# Patient Record
Sex: FEMALE | Race: WHITE | NOT HISPANIC OR LATINO | Employment: FULL TIME | URBAN - METROPOLITAN AREA
[De-identification: names, ages, dates, MRNs, and addresses within clinical notes are randomized per-mention and may not be internally consistent; named-entity substitution may affect disease eponyms.]

---

## 2020-06-03 ENCOUNTER — TELEPHONE (OUTPATIENT)
Dept: OBSTETRICS AND GYNECOLOGY | Facility: CLINIC | Age: 50
End: 2020-06-03

## 2020-06-03 NOTE — TELEPHONE ENCOUNTER
----- Message from Sb Ramirez sent at 6/3/2020  2:47 PM CDT -----  Contact: Pt  Please call Shadia to discuss an appointment question she has [phone] (home).

## 2020-06-10 ENCOUNTER — TELEPHONE (OUTPATIENT)
Dept: OBSTETRICS AND GYNECOLOGY | Facility: CLINIC | Age: 50
End: 2020-06-10

## 2020-06-10 NOTE — TELEPHONE ENCOUNTER
Spoke with pt, she was questioning if we had coded her breast mri differently then last year, the breast center told her it was going to her deductible and last year they covered it at 100%. I told her we coded it the same and that she should call her insurance company to see why it is going deductible. PT verbalized understanding. AF

## 2020-06-10 NOTE — TELEPHONE ENCOUNTER
----- Message from Joanie Mederos sent at 6/10/2020 10:25 AM CDT -----  Contact: pt  Please call back need to talk about test [phone]

## 2020-06-12 ENCOUNTER — TELEPHONE (OUTPATIENT)
Dept: OBSTETRICS AND GYNECOLOGY | Facility: CLINIC | Age: 50
End: 2020-06-12

## 2020-06-12 NOTE — TELEPHONE ENCOUNTER
----- Message from Kimberley Santyoo NP sent at 6/12/2020 12:33 PM CDT -----  Please call and inform patient her recent breast imaging results were within normal limits.

## 2020-06-16 ENCOUNTER — TELEPHONE (OUTPATIENT)
Dept: OBSTETRICS AND GYNECOLOGY | Facility: CLINIC | Age: 50
End: 2020-06-16

## 2020-06-16 NOTE — TELEPHONE ENCOUNTER
Pt called, pt verified , pt notified of normal breast imaging, pt acknowledged understanding.No complaints noted.

## 2020-07-27 ENCOUNTER — OFFICE VISIT (OUTPATIENT)
Dept: OBSTETRICS AND GYNECOLOGY | Facility: CLINIC | Age: 50
End: 2020-07-27
Payer: COMMERCIAL

## 2020-07-27 VITALS
DIASTOLIC BLOOD PRESSURE: 62 MMHG | WEIGHT: 149 LBS | HEIGHT: 70 IN | SYSTOLIC BLOOD PRESSURE: 110 MMHG | BODY MASS INDEX: 21.33 KG/M2

## 2020-07-27 DIAGNOSIS — Z91.89 AT HIGH RISK FOR BREAST CANCER: Primary | ICD-10-CM

## 2020-07-27 PROCEDURE — 99213 PR OFFICE/OUTPT VISIT, EST, LEVL III, 20-29 MIN: ICD-10-PCS | Mod: S$GLB,,, | Performed by: OBSTETRICS & GYNECOLOGY

## 2020-07-27 PROCEDURE — 99213 OFFICE O/P EST LOW 20 MIN: CPT | Mod: S$GLB,,, | Performed by: OBSTETRICS & GYNECOLOGY

## 2020-07-27 PROCEDURE — 3008F PR BODY MASS INDEX (BMI) DOCUMENTED: ICD-10-PCS | Mod: CPTII,S$GLB,, | Performed by: OBSTETRICS & GYNECOLOGY

## 2020-07-27 PROCEDURE — 3008F BODY MASS INDEX DOCD: CPT | Mod: CPTII,S$GLB,, | Performed by: OBSTETRICS & GYNECOLOGY

## 2020-07-27 RX ORDER — EFINACONAZOLE 100 MG/ML
SOLUTION TOPICAL
COMMUNITY
Start: 2020-05-28

## 2020-07-27 RX ORDER — MONTELUKAST SODIUM 10 MG/1
TABLET ORAL
COMMUNITY
Start: 2017-07-27

## 2020-07-27 RX ORDER — ESTRADIOL 1 MG/1
TABLET ORAL
COMMUNITY
Start: 2020-07-16 | End: 2021-09-13

## 2020-07-27 RX ORDER — LEVOTHYROXINE SODIUM 150 MCG
TABLET ORAL
COMMUNITY
Start: 2020-07-22

## 2020-07-27 NOTE — PROGRESS NOTES
Subjective:       Patient ID: Shadia Washington is a 50 y.o. female.    Chief Complaint:  Breast Problem (breast check )      History of Present Illness  HPI  Presents for visit. S/p MRI and Mammogram  Doing well    GYN & OB History  No LMP recorded. Patient has had a hysterectomy.   Date of Last Pap: No result found    OB History    Para Term  AB Living   2         2   SAB TAB Ectopic Multiple Live Births                  # Outcome Date GA Lbr Ubaldo/2nd Weight Sex Delivery Anes PTL Lv   2             1                 Review of Systems  Review of Systems   Constitutional: Negative for activity change, appetite change, chills, diaphoresis, fatigue, fever and unexpected weight change.   Respiratory: Negative for cough and shortness of breath.    Cardiovascular: Negative for chest pain, palpitations and leg swelling.   Gastrointestinal: Negative for abdominal pain, bloating, constipation and diarrhea.   Genitourinary: Negative for decreased libido, dysmenorrhea, vaginal bleeding, vaginal discharge, vaginal pain, vaginal dryness and vaginal odor.   Musculoskeletal: Negative for back pain and joint swelling.   Integumentary:  Negative for rash and acne.   Psychiatric/Behavioral: Negative for depression. The patient is not nervous/anxious.            Objective:    Physical Exam:   Constitutional: She is oriented to person, place, and time. She appears well-developed and well-nourished. She is cooperative.      Neck: No thyroid mass and no thyromegaly present.    Cardiovascular: Normal rate and normal pulses.     Pulmonary/Chest: Effort normal. No respiratory distress. Chest wall is not dull to percussion. She exhibits no mass, no tenderness, no bony tenderness, no laceration, no crepitus, no edema, no deformity, no swelling and no retraction. Right breast exhibits no inverted nipple, no mass, no nipple discharge, no skin change, no tenderness, presence, no bleeding and no swelling. Left breast  exhibits no inverted nipple, no mass, no nipple discharge, no skin change, no tenderness, presence, no bleeding and no swelling. Breasts are symmetrical.   Fibrocystic changes bilaterally. Symmetrically.        Abdominal: Soft. Normal appearance. She exhibits no distension. There is no abdominal tenderness. No hernia.             Musculoskeletal: Moves all extremeties.       Neurological: She is alert and oriented to person, place, and time.    Skin: Skin is warm and dry. No rash noted.    Psychiatric: She has a normal mood and affect. Her speech is normal and behavior is normal. Judgment and thought content normal.          Assessment:     Breast Exam (High risk for breast cancer)        Plan:      Discussed votiva

## 2021-03-01 ENCOUNTER — OFFICE VISIT (OUTPATIENT)
Dept: OBSTETRICS AND GYNECOLOGY | Facility: CLINIC | Age: 51
End: 2021-03-01
Payer: COMMERCIAL

## 2021-03-01 VITALS
HEIGHT: 70 IN | DIASTOLIC BLOOD PRESSURE: 82 MMHG | BODY MASS INDEX: 21.47 KG/M2 | WEIGHT: 150 LBS | HEART RATE: 74 BPM | SYSTOLIC BLOOD PRESSURE: 121 MMHG

## 2021-03-01 DIAGNOSIS — Z01.419 ROUTINE GYNECOLOGICAL EXAMINATION: Primary | ICD-10-CM

## 2021-03-01 PROCEDURE — 3008F PR BODY MASS INDEX (BMI) DOCUMENTED: ICD-10-PCS | Mod: CPTII,S$GLB,, | Performed by: OBSTETRICS & GYNECOLOGY

## 2021-03-01 PROCEDURE — 99396 PREV VISIT EST AGE 40-64: CPT | Mod: S$GLB,,, | Performed by: OBSTETRICS & GYNECOLOGY

## 2021-03-01 PROCEDURE — 3008F BODY MASS INDEX DOCD: CPT | Mod: CPTII,S$GLB,, | Performed by: OBSTETRICS & GYNECOLOGY

## 2021-03-01 PROCEDURE — 1126F AMNT PAIN NOTED NONE PRSNT: CPT | Mod: S$GLB,,, | Performed by: OBSTETRICS & GYNECOLOGY

## 2021-03-01 PROCEDURE — 99396 PR PREVENTIVE VISIT,EST,40-64: ICD-10-PCS | Mod: S$GLB,,, | Performed by: OBSTETRICS & GYNECOLOGY

## 2021-03-01 PROCEDURE — 1126F PR PAIN SEVERITY QUANTIFIED, NO PAIN PRESENT: ICD-10-PCS | Mod: S$GLB,,, | Performed by: OBSTETRICS & GYNECOLOGY

## 2021-03-01 RX ORDER — ESTRADIOL 0.1 MG/G
1 CREAM VAGINAL DAILY
Qty: 42.5 G | Refills: 1 | Status: SHIPPED | OUTPATIENT
Start: 2021-03-01 | End: 2021-09-13

## 2021-03-01 RX ORDER — ESTRADIOL 0.1 MG/D
1 FILM, EXTENDED RELEASE TRANSDERMAL
Qty: 8 PATCH | Refills: 12 | Status: SHIPPED | OUTPATIENT
Start: 2021-03-01 | End: 2022-03-15 | Stop reason: SDUPTHER

## 2021-03-15 ENCOUNTER — TELEPHONE (OUTPATIENT)
Dept: OBSTETRICS AND GYNECOLOGY | Facility: CLINIC | Age: 51
End: 2021-03-15

## 2021-03-16 ENCOUNTER — TELEPHONE (OUTPATIENT)
Dept: OBSTETRICS AND GYNECOLOGY | Facility: CLINIC | Age: 51
End: 2021-03-16

## 2021-03-22 ENCOUNTER — TELEPHONE (OUTPATIENT)
Dept: OBSTETRICS AND GYNECOLOGY | Facility: CLINIC | Age: 51
End: 2021-03-22

## 2021-09-07 ENCOUNTER — OFFICE VISIT (OUTPATIENT)
Dept: OBSTETRICS AND GYNECOLOGY | Facility: CLINIC | Age: 51
End: 2021-09-07
Payer: COMMERCIAL

## 2021-09-07 VITALS
DIASTOLIC BLOOD PRESSURE: 79 MMHG | HEART RATE: 90 BPM | HEIGHT: 70 IN | SYSTOLIC BLOOD PRESSURE: 120 MMHG | BODY MASS INDEX: 21.49 KG/M2 | WEIGHT: 150.13 LBS

## 2021-09-07 DIAGNOSIS — Z91.89 AT HIGH RISK FOR BREAST CANCER: Primary | ICD-10-CM

## 2021-09-07 PROCEDURE — 3008F BODY MASS INDEX DOCD: CPT | Mod: CPTII,S$GLB,, | Performed by: NURSE PRACTITIONER

## 2021-09-07 PROCEDURE — 1159F MED LIST DOCD IN RCRD: CPT | Mod: CPTII,S$GLB,, | Performed by: NURSE PRACTITIONER

## 2021-09-07 PROCEDURE — 3074F SYST BP LT 130 MM HG: CPT | Mod: CPTII,S$GLB,, | Performed by: NURSE PRACTITIONER

## 2021-09-07 PROCEDURE — 3078F DIAST BP <80 MM HG: CPT | Mod: CPTII,S$GLB,, | Performed by: NURSE PRACTITIONER

## 2021-09-07 PROCEDURE — 3078F PR MOST RECENT DIASTOLIC BLOOD PRESSURE < 80 MM HG: ICD-10-PCS | Mod: CPTII,S$GLB,, | Performed by: NURSE PRACTITIONER

## 2021-09-07 PROCEDURE — 1159F PR MEDICATION LIST DOCUMENTED IN MEDICAL RECORD: ICD-10-PCS | Mod: CPTII,S$GLB,, | Performed by: NURSE PRACTITIONER

## 2021-09-07 PROCEDURE — 99213 PR OFFICE/OUTPT VISIT, EST, LEVL III, 20-29 MIN: ICD-10-PCS | Mod: S$GLB,,, | Performed by: NURSE PRACTITIONER

## 2021-09-07 PROCEDURE — 3008F PR BODY MASS INDEX (BMI) DOCUMENTED: ICD-10-PCS | Mod: CPTII,S$GLB,, | Performed by: NURSE PRACTITIONER

## 2021-09-07 PROCEDURE — 99213 OFFICE O/P EST LOW 20 MIN: CPT | Mod: S$GLB,,, | Performed by: NURSE PRACTITIONER

## 2021-09-07 PROCEDURE — 3074F PR MOST RECENT SYSTOLIC BLOOD PRESSURE < 130 MM HG: ICD-10-PCS | Mod: CPTII,S$GLB,, | Performed by: NURSE PRACTITIONER

## 2021-09-13 PROBLEM — Z91.89 AT HIGH RISK FOR BREAST CANCER: Status: ACTIVE | Noted: 2021-09-13

## 2022-02-28 ENCOUNTER — TELEPHONE (OUTPATIENT)
Dept: OBSTETRICS AND GYNECOLOGY | Facility: CLINIC | Age: 52
End: 2022-02-28
Payer: COMMERCIAL

## 2022-04-06 ENCOUNTER — OFFICE VISIT (OUTPATIENT)
Dept: OBSTETRICS AND GYNECOLOGY | Facility: CLINIC | Age: 52
End: 2022-04-06
Payer: COMMERCIAL

## 2022-04-06 VITALS
WEIGHT: 152 LBS | HEART RATE: 96 BPM | SYSTOLIC BLOOD PRESSURE: 138 MMHG | HEIGHT: 70 IN | BODY MASS INDEX: 21.76 KG/M2 | DIASTOLIC BLOOD PRESSURE: 90 MMHG

## 2022-04-06 DIAGNOSIS — Z01.419 ROUTINE GYNECOLOGICAL EXAMINATION: ICD-10-CM

## 2022-04-06 DIAGNOSIS — Z12.39 BREAST CANCER SCREENING, HIGH RISK PATIENT: ICD-10-CM

## 2022-04-06 DIAGNOSIS — Z12.31 SCREENING MAMMOGRAM FOR HIGH-RISK PATIENT: Primary | ICD-10-CM

## 2022-04-06 PROCEDURE — 1159F PR MEDICATION LIST DOCUMENTED IN MEDICAL RECORD: ICD-10-PCS | Mod: CPTII,S$GLB,, | Performed by: OBSTETRICS & GYNECOLOGY

## 2022-04-06 PROCEDURE — 3080F DIAST BP >= 90 MM HG: CPT | Mod: CPTII,S$GLB,, | Performed by: OBSTETRICS & GYNECOLOGY

## 2022-04-06 PROCEDURE — 99396 PR PREVENTIVE VISIT,EST,40-64: ICD-10-PCS | Mod: S$GLB,,, | Performed by: OBSTETRICS & GYNECOLOGY

## 2022-04-06 PROCEDURE — 99396 PREV VISIT EST AGE 40-64: CPT | Mod: S$GLB,,, | Performed by: OBSTETRICS & GYNECOLOGY

## 2022-04-06 PROCEDURE — 1159F MED LIST DOCD IN RCRD: CPT | Mod: CPTII,S$GLB,, | Performed by: OBSTETRICS & GYNECOLOGY

## 2022-04-06 PROCEDURE — 3008F PR BODY MASS INDEX (BMI) DOCUMENTED: ICD-10-PCS | Mod: CPTII,S$GLB,, | Performed by: OBSTETRICS & GYNECOLOGY

## 2022-04-06 PROCEDURE — 3075F SYST BP GE 130 - 139MM HG: CPT | Mod: CPTII,S$GLB,, | Performed by: OBSTETRICS & GYNECOLOGY

## 2022-04-06 PROCEDURE — 3080F PR MOST RECENT DIASTOLIC BLOOD PRESSURE >= 90 MM HG: ICD-10-PCS | Mod: CPTII,S$GLB,, | Performed by: OBSTETRICS & GYNECOLOGY

## 2022-04-06 PROCEDURE — 3075F PR MOST RECENT SYSTOLIC BLOOD PRESS GE 130-139MM HG: ICD-10-PCS | Mod: CPTII,S$GLB,, | Performed by: OBSTETRICS & GYNECOLOGY

## 2022-04-06 PROCEDURE — 3008F BODY MASS INDEX DOCD: CPT | Mod: CPTII,S$GLB,, | Performed by: OBSTETRICS & GYNECOLOGY

## 2022-04-06 RX ORDER — ESTRADIOL 0.1 MG/D
1 FILM, EXTENDED RELEASE TRANSDERMAL
Qty: 8 PATCH | Refills: 12 | Status: SHIPPED | OUTPATIENT
Start: 2022-04-07 | End: 2022-06-21

## 2022-04-06 NOTE — PROGRESS NOTES
Patient is present for annual well woman visit. States concerns of vaginal dryness and decrease libido.

## 2022-04-06 NOTE — PROGRESS NOTES
Subjective:       Patient ID: Shadia Washington is a 52 y.o. female.    Chief Complaint:  Well Woman      History of Present Illness  HPI  Patient with decreased libido and vaginal dryness.     GYN & OB History  No LMP recorded. Patient has had a hysterectomy.   Date of Last Pap: No result found    OB History    Para Term  AB Living   2 2 1 1   2   SAB IAB Ectopic Multiple Live Births           2      # Outcome Date GA Lbr Ubaldo/2nd Weight Sex Delivery Anes PTL Lv   2 Term 90 40w0d  3.6 kg (7 lb 15 oz)  Vag-Spont None N STEVEN   1  87 36w0d  2.268 kg (5 lb) F Vag-Spont None Y STEVEN       Review of Systems  Review of Systems   Constitutional: Negative for activity change, appetite change, fatigue, fever and unexpected weight change.   HENT: Negative for nasal congestion and tinnitus.    Eyes: Negative for visual disturbance.   Respiratory: Negative for cough and shortness of breath.    Cardiovascular: Negative for chest pain and leg swelling.   Gastrointestinal: Negative for abdominal pain, bloating, blood in stool, constipation and diarrhea.   Genitourinary: Positive for decreased libido and dyspareunia. Negative for bladder incontinence, dysuria, vaginal bleeding, vaginal discharge, vaginal pain, vaginal dryness and vaginal odor.   Musculoskeletal: Negative for arthralgias, back pain and joint swelling.   Integumentary:  Negative for acne.   Neurological: Negative for headaches.   Psychiatric/Behavioral: Negative for depression and sleep disturbance. The patient is not nervous/anxious.            Objective:    Physical Exam:   Constitutional: She is oriented to person, place, and time. Vital signs are normal. She appears well-developed and well-nourished. She is cooperative.      Neck: No thyroid mass and no thyromegaly present.    Cardiovascular: Normal rate, regular rhythm and normal pulses.     Pulmonary/Chest: Effort normal. No respiratory distress. Chest wall is not dull to percussion.  She exhibits no mass, no bony tenderness, no laceration, no crepitus, no edema, no deformity, no swelling and no retraction. Right breast exhibits no inverted nipple, no mass, no nipple discharge, no skin change, no tenderness, presence, no bleeding and no swelling. Left breast exhibits no inverted nipple, no mass, no nipple discharge, no skin change, no tenderness, presence, no bleeding and no swelling.        Abdominal: Soft. She exhibits no distension. There is no abdominal tenderness. No hernia.     Genitourinary:    Vagina, uterus and rectum normal.      Pelvic exam was performed with patient supine.   Labial bartholins normal.There is no rash, tenderness, lesion or injury on the right labia. There is no rash, tenderness, lesion or injury on the left labia. Cervix is normal. Right adnexum displays no mass, no tenderness and no fullness. Left adnexum displays no mass, no tenderness and no fullness. No  no vaginal discharge, rectocele, cystocele or unspecified prolapse of vaginal walls in the vagina.           Musculoskeletal: Moves all extremeties.       Neurological: She is alert and oriented to person, place, and time.    Skin: Skin is warm and dry. No rash noted.    Psychiatric: She has a normal mood and affect. Her speech is normal and behavior is normal. Judgment and thought content normal.          Assessment:        1. Screening mammogram for high-risk patient    Well Woman Exam        Plan:      Routine care  Discussed votiva   Continue estrogen patch.

## 2022-05-17 ENCOUNTER — PATIENT MESSAGE (OUTPATIENT)
Dept: OBSTETRICS AND GYNECOLOGY | Facility: CLINIC | Age: 52
End: 2022-05-17
Payer: COMMERCIAL

## 2022-06-21 ENCOUNTER — TELEPHONE (OUTPATIENT)
Dept: OBSTETRICS AND GYNECOLOGY | Facility: CLINIC | Age: 52
End: 2022-06-21
Payer: COMMERCIAL

## 2022-06-21 RX ORDER — ESTRADIOL 1 MG/G
1 GEL TOPICAL DAILY
Qty: 30 G | Refills: 12 | Status: SHIPPED | OUTPATIENT
Start: 2022-06-21 | End: 2022-07-19

## 2022-06-21 NOTE — TELEPHONE ENCOUNTER
Patient's call was returned,she says her Estradiol patch is causing her to breakout and would like to have something else called out, provider notified and new medication was sent out.           kavita

## 2022-06-22 ENCOUNTER — TELEPHONE (OUTPATIENT)
Dept: OBSTETRICS AND GYNECOLOGY | Facility: CLINIC | Age: 52
End: 2022-06-22
Payer: COMMERCIAL

## 2022-06-22 NOTE — TELEPHONE ENCOUNTER
Returned patient's call, she is wanting to change her medication from Divigel to a pill form of estradiol. Told patient that the provider is out of the office and will address when she returns. Patient acknowledged understanding.           kavita

## 2022-06-22 NOTE — TELEPHONE ENCOUNTER
----- Message from Nola Quiles sent at 6/22/2022  4:18 PM CDT -----  Contact: Patient  Patient need the PILL called in  instead of the GEL RX      estradioL (DIVIGEL) 1 mg/gram (0.1 %) topical gel              .  [id number] - [place] SERV[address] [facility] AT [facility] OF [facility] [address] [address]  Phone: [phone] Fax: [phone]        Patient call back #   [phone]

## 2022-06-28 RX ORDER — ESTRADIOL 2 MG/1
2 TABLET ORAL DAILY
Qty: 90 TABLET | Refills: 3 | Status: SHIPPED | OUTPATIENT
Start: 2022-06-28 | End: 2022-07-19

## 2022-07-19 ENCOUNTER — PATIENT MESSAGE (OUTPATIENT)
Dept: OBSTETRICS AND GYNECOLOGY | Facility: CLINIC | Age: 52
End: 2022-07-19
Payer: COMMERCIAL

## 2022-07-19 RX ORDER — ESTRADIOL 0.1 MG/D
1 FILM, EXTENDED RELEASE TRANSDERMAL
Qty: 8 PATCH | Refills: 12 | Status: SHIPPED | OUTPATIENT
Start: 2022-07-21 | End: 2023-05-03

## 2022-10-10 ENCOUNTER — TELEPHONE (OUTPATIENT)
Dept: OBSTETRICS AND GYNECOLOGY | Facility: CLINIC | Age: 52
End: 2022-10-10
Payer: COMMERCIAL

## 2022-10-10 NOTE — TELEPHONE ENCOUNTER
Phone message noted. Pt in clinic correct dx code applied to order.          ----- Message from Nola Quiles sent at 10/10/2022  8:27 AM CDT -----  Contact: [facility]  Please call Lakeisha from [facility].  Lakeisha need a corrected order.  You all sent a Mammogram order but patient is having a MRI      Patient is there waiting.   Please send ASAP      Fax #  [phone]      Call back #   [phone]

## 2022-11-03 PROBLEM — S46.011A TRAUMATIC INCOMPLETE TEAR OF RIGHT ROTATOR CUFF: Status: ACTIVE | Noted: 2022-11-03

## 2022-11-03 PROBLEM — S43.431A SUPERIOR GLENOID LABRUM LESION OF RIGHT SHOULDER: Status: ACTIVE | Noted: 2022-11-03

## 2022-11-03 PROBLEM — M19.011 PRIMARY OSTEOARTHRITIS OF RIGHT SHOULDER: Status: ACTIVE | Noted: 2022-11-03

## 2023-05-03 RX ORDER — ESTRADIOL 1 MG/1
TABLET ORAL
COMMUNITY
Start: 2023-01-24 | End: 2023-05-04 | Stop reason: SDUPTHER

## 2023-05-03 NOTE — PROGRESS NOTES
CC:  WELL WOMAN (menop/ hyst with BSO)  Patient Care Team:  Priyanka Wells MD as PCP - General (Internal Medicine)    NEW PATIENT       HPI:  Patient is a 53 y.o. who presents for her well woman exam today.  History reviewed with patient.   Patient is without complaints or concerns today.     Her hyst was for bleeding in    HRT:  estrogen only (systemic)  HX ABNL PAPS:  -pap neg, hpv +    REVIEW OF PRIOR DATA/ HEALTH MAINTENANCE:  LAST ANNUAL:   2022    LAST MMG (screening)- 2022- normal at [facility] and LAST BREAST MRI- 2022 at [facility]    LAST LABS- up to date with PCP    LAST COLONOSCOPY- - by Dr. Sterling - q 10 yrs (neg)   LAST DEXA- - normal at [facility]    Past Medical History:   Diagnosis Date    Dense breast tissue on mammogram     Gastric ulcer     History of HPV infection     Increased risk of breast cancer     LTR 27%, genetic testing neg, mmg/mri q 6 mo    Menopausal state     Thyroid cancer      SURGICAL HX:   has a past surgical history that includes Back surgery; Cholecystectomy; Hand surgery (Right); Bladder suspension; Thyroidectomy; Hysteroscopy (); Colposcopy (); Pelvic laparoscopy (); Tubal ligation (); and Total abdominal hysterectomy ().    SOCIAL HX:    reports that she has never smoked. She has never used smokeless tobacco. She reports current alcohol use of about 2.0 standard drinks per week. She reports that she does not use drugs.    FAMILY HX:   family history includes Asthma in her father; Breast cancer in her maternal aunt, mother, sister, and sister; Cancer in her maternal grandmother; Diabetes in her mother and sister; Heart failure in her father; Hypertension in her mother; Rheum arthritis in her sister and sister. .    ALLERGIES:  Patient has no known allergies.    Current Outpatient Medications   Medication Instructions    estradioL (ESTRACE) 1 mg, Oral, Daily    fluticasone propionate (FLONASE) 50 mcg/actuation nasal  "spray SHAKE LIQUID AND USE 1 SPRAY IN EACH NOSTRIL TWICE DAILY FOR 10 DAYS    JUBLIA 10 % Grzegorz APPLY 1 DROP TO ALL SMALLER TOES AND 2 DROPS TO GREATER TOES DAILY    montelukast (SINGULAIR) 10 mg tablet No dose, route, or frequency recorded.    SYNTHROID 150 mcg tablet No dose, route, or frequency recorded.     ROS:  CONST:  No fever, chills, fatigue or unexpected changes in weight   CV: No chest pain or palpitations   RESP:  No shortness of breath or cough   GI: No abd pain, vomiting, diarrhea, blood in stool, or changes in bowel mvmts   SKIN: No rashes or lesions  MUSCULOSKELETAL: No joint swelling or pain   PSYCH: No changes in mood or insomia   BREASTS: No asymmetry, lumps, pain, nipple discharge, or skin changes   :  No dysuria, urgency, frequency, hematuria or incontinence           No vag dc, itching, odor or dryness           No pelvic pain, dyspareunia, or abnormal vaginal bleeding     VITALS:  Blood pressure 126/86, height 5' 10" (1.778 m), weight 69.4 kg (153 lb).  Body mass index is 21.95 kg/m².     PHYSICAL EXAM-  APPEARANCE: Well appearing, in no acute distress.   NECK: Neck symmetric   CV:  Normal rate   PULM: Normal resp rate, no resp distress, normal resp effort   PSYCH:  Normal mood and affect, cooperative   SKIN: No rashes, lesions, or abnormal bruising   LYMPH: No inguinal or axillary adenopathy   ABD: Soft, without tenderness or masses.    BREAST: Symmetrical, no nipple changes, no skin changes, No palpable masses   PELVIC:  VULVA: Normal female genitalia. No lesions.   URETHRAL MEATUS: No masses, no significant prolapse.  BLADDER/ URETHRA: No masses or suprapubic tenderness   VAGINA/ CUFF: No lesions. +atrophic changes. No discharge   PELVIS: No masses, tenderness, or fullness on bimanual exam   ANUS/ PERINEUM: Normal tone.  No lesions.     *female chaperone present for entire exam    ASSESSMENT and PLAN:  Breast cancer screening by mammogram  -     Mammo Digital Screening Bilat w/ Ruddy; " Future; Expected date: 05/17/2023    Encounter for well woman exam with routine gynecological exam  -     Liquid-based pap smear, screening    Hormone replacement therapy (HRT)  -     estradioL (ESTRACE) 1 MG tablet; Take 1 tablet (1 mg total) by mouth once daily.  Dispense: 90 each; Refill: 4    Menopausal state    At high risk for breast cancer  -     MRI Breast w/wo Contrast, w/CAD, Bilateral; Future; Expected date: 08/04/2023       FOLLOWUP:  1 year for wwe or sooner prn    COUNSELING:  Patient was counseled today on recommendations for yearly pelvic exam, current Pap guidelines, self breast exams, annual screening mammograms, routine screening colonoscopy, and screening bone density. Reviewed calcium and vitamin D supplements and weight bearing exercise to minimize risks.  Encouraged patient to see her PCP for other health maintenance.

## 2023-05-04 ENCOUNTER — OFFICE VISIT (OUTPATIENT)
Dept: OBSTETRICS AND GYNECOLOGY | Facility: CLINIC | Age: 53
End: 2023-05-04
Payer: COMMERCIAL

## 2023-05-04 VITALS
DIASTOLIC BLOOD PRESSURE: 86 MMHG | BODY MASS INDEX: 21.9 KG/M2 | HEIGHT: 70 IN | SYSTOLIC BLOOD PRESSURE: 126 MMHG | WEIGHT: 153 LBS

## 2023-05-04 DIAGNOSIS — N95.1 MENOPAUSAL STATE: ICD-10-CM

## 2023-05-04 DIAGNOSIS — Z91.89 AT HIGH RISK FOR BREAST CANCER: ICD-10-CM

## 2023-05-04 DIAGNOSIS — Z79.890 HORMONE REPLACEMENT THERAPY (HRT): ICD-10-CM

## 2023-05-04 DIAGNOSIS — Z12.31 BREAST CANCER SCREENING BY MAMMOGRAM: Primary | ICD-10-CM

## 2023-05-04 DIAGNOSIS — Z01.419 ENCOUNTER FOR WELL WOMAN EXAM WITH ROUTINE GYNECOLOGICAL EXAM: ICD-10-CM

## 2023-05-04 PROCEDURE — 3008F PR BODY MASS INDEX (BMI) DOCUMENTED: ICD-10-PCS | Mod: CPTII,S$GLB,, | Performed by: OBSTETRICS & GYNECOLOGY

## 2023-05-04 PROCEDURE — 99396 PREV VISIT EST AGE 40-64: CPT | Mod: S$GLB,,, | Performed by: OBSTETRICS & GYNECOLOGY

## 2023-05-04 PROCEDURE — 1159F PR MEDICATION LIST DOCUMENTED IN MEDICAL RECORD: ICD-10-PCS | Mod: CPTII,S$GLB,, | Performed by: OBSTETRICS & GYNECOLOGY

## 2023-05-04 PROCEDURE — 3008F BODY MASS INDEX DOCD: CPT | Mod: CPTII,S$GLB,, | Performed by: OBSTETRICS & GYNECOLOGY

## 2023-05-04 PROCEDURE — 3079F PR MOST RECENT DIASTOLIC BLOOD PRESSURE 80-89 MM HG: ICD-10-PCS | Mod: CPTII,S$GLB,, | Performed by: OBSTETRICS & GYNECOLOGY

## 2023-05-04 PROCEDURE — 3074F SYST BP LT 130 MM HG: CPT | Mod: CPTII,S$GLB,, | Performed by: OBSTETRICS & GYNECOLOGY

## 2023-05-04 PROCEDURE — 3074F PR MOST RECENT SYSTOLIC BLOOD PRESSURE < 130 MM HG: ICD-10-PCS | Mod: CPTII,S$GLB,, | Performed by: OBSTETRICS & GYNECOLOGY

## 2023-05-04 PROCEDURE — 1159F MED LIST DOCD IN RCRD: CPT | Mod: CPTII,S$GLB,, | Performed by: OBSTETRICS & GYNECOLOGY

## 2023-05-04 PROCEDURE — 99396 PR PREVENTIVE VISIT,EST,40-64: ICD-10-PCS | Mod: S$GLB,,, | Performed by: OBSTETRICS & GYNECOLOGY

## 2023-05-04 PROCEDURE — 3079F DIAST BP 80-89 MM HG: CPT | Mod: CPTII,S$GLB,, | Performed by: OBSTETRICS & GYNECOLOGY

## 2023-05-04 RX ORDER — FLUTICASONE PROPIONATE 50 MCG
SPRAY, SUSPENSION (ML) NASAL
COMMUNITY
Start: 2022-12-11

## 2023-05-04 RX ORDER — ESTRADIOL 1 MG/1
1 TABLET ORAL DAILY
Qty: 90 EACH | Refills: 4 | Status: SHIPPED | OUTPATIENT
Start: 2023-05-04 | End: 2023-07-26 | Stop reason: SDUPTHER

## 2023-05-09 LAB — Lab: NORMAL

## 2023-05-15 ENCOUNTER — TELEPHONE (OUTPATIENT)
Dept: OBSTETRICS AND GYNECOLOGY | Facility: CLINIC | Age: 53
End: 2023-05-15
Payer: COMMERCIAL

## 2023-05-15 NOTE — TELEPHONE ENCOUNTER
Spoke with patient. Two pt identifiers confirmed. Notified patient of her pap results. Patient verbalized understanding.

## 2023-07-26 DIAGNOSIS — Z79.890 HORMONE REPLACEMENT THERAPY (HRT): ICD-10-CM

## 2023-07-26 RX ORDER — ESTRADIOL 1 MG/1
1 TABLET ORAL DAILY
Qty: 90 TABLET | Refills: 4 | Status: SHIPPED | OUTPATIENT
Start: 2023-07-26 | End: 2024-07-25

## 2023-08-04 ENCOUNTER — TELEPHONE (OUTPATIENT)
Dept: OBSTETRICS AND GYNECOLOGY | Facility: CLINIC | Age: 53
End: 2023-08-04
Payer: COMMERCIAL

## 2023-08-04 DIAGNOSIS — N63.0 SWELLING OF BREAST: Primary | ICD-10-CM

## 2023-08-04 NOTE — TELEPHONE ENCOUNTER
----- Message from Nicole Gavin sent at 8/4/2023  9:05 AM CDT -----  Regarding: pt advice  Contact: pt  Type:  Needs Medical Advice    Who Called:  Shadia Washington   Symptoms (please be specific):  left breast enlarged   How long has patient had these symptoms:   today  Pharmacy name and phone #:       [id number] - [place], [place] - [address] [facility] [address] AT [facility] OF  [address]  [address] SERV[address] [facility] [address]  Phone: [phone] Fax: [phone]    Would the patient rather a call back or a response via [facility]?  Call back   Best Call Back Number:  [phone]  Additional Information:

## 2023-08-04 NOTE — TELEPHONE ENCOUNTER
Spoke with patient. She reports waking up this morning with her left breast swollen. She denies any pain, warmth, bruising, or discoloration. Informed patient that we would order an ultrasound. Patient to call to schedule. Advised patient to go to an Urgent Care for evaluation and treatment is she has any of the above symptoms. Patient verbalized understanding.

## 2023-10-23 ENCOUNTER — TELEPHONE (OUTPATIENT)
Dept: OBSTETRICS AND GYNECOLOGY | Facility: CLINIC | Age: 53
End: 2023-10-23
Payer: COMMERCIAL

## 2023-10-23 NOTE — TELEPHONE ENCOUNTER
----- Message from Carloyn Jordan MD sent at 10/23/2023 12:16 PM CDT -----  Please call pt and let her know that her screening breast MRI looks all normal    If she is not on there portal, please encourage her to do so

## 2023-10-23 NOTE — TELEPHONE ENCOUNTER
Spoke with patient. Two pt identifiers confirmed. Notified patient of her MRI results. Patient verbalized understanding.

## 2024-05-17 NOTE — PROGRESS NOTES
CC:  WELL WOMAN (menop/ hyst with BSO)  Patient Care Team:  Priyanka Wells MD as PCP - General (Internal Medicine)    Last visit with me was on  2023 for wwe    HPI:  Patient is a 54 y.o. who presents for her well woman exam today.  History reviewed with patient.  Very busy helping her daughter with twins and is having difficulty falling asleep. Doesn't want to take sleep meds but benadryl works .  Wants to try progesterone again. Felt like she slept a little better after having pellet in dec 2023 but hair started falling out and grew hair on her face so did not continue,   Patient is without complaints or concerns today.     Her hyst was for bleeding and fibroids in    HRT: estrogen only (systemic)  HX ABNL PAPS: -pap neg, hpv +     REVIEW OF PRIOR DATA/ HEALTH MAINTENANCE:  LAST ANNUAL:   May 4 2023    LAST MMG (screening)- May 2023-  [facility]    LAST COLONOSCOPY- - by Dr. Sterling - q 10 yrs (neg)   LAST DEXA- - normal at [facility]    Past Medical History:   Diagnosis Date    Dense breast tissue on mammogram     Gastric ulcer     History of HPV infection     Increased risk of breast cancer     LTR 27%, genetic testing neg, mmg/mri q 6 mo    Menopausal state     Thyroid cancer      SURGICAL HX:   has a past surgical history that includes Back surgery; Cholecystectomy; Hand surgery (Right); Bladder suspension; Thyroidectomy; Hysteroscopy (); Colposcopy (); Pelvic laparoscopy (); Tubal ligation (); and Total abdominal hysterectomy ().    SOCIAL HX:    reports that she has never smoked. She has never used smokeless tobacco. She reports current alcohol use of about 2.0 standard drinks of alcohol per week. She reports that she does not use drugs.    Current Outpatient Medications   Medication Instructions    estradioL (ESTRACE) 1 mg, Oral, Daily    fluticasone propionate (FLONASE) 50 mcg/actuation nasal spray SHAKE LIQUID AND USE 1 SPRAY IN EACH NOSTRIL TWICE DAILY FOR 10  "DAYS    progesterone (PROMETRIUM) 100 mg, Oral, Nightly    SYNTHROID 150 mcg tablet No dose, route, or frequency recorded.     VITALS:  Blood pressure (!) 147/83, height 5' 10" (1.778 m), weight 70.8 kg (156 lb).  Body mass index is 22.38 kg/m².     PHYSICAL EXAM-  APPEARANCE: Well appearing, in no acute distress.   NECK: Neck symmetric   CV/PULM: No resp distress, normal resp effort   PSYCH:  Normal mood and affect, cooperative   SKIN: No rashes, lesions, or abnormal bruising   ABD: Soft, without tenderness or masses.    BREAST: No skin changes or nipple dc.  No palpable masses or tenderness  PELVIC:  VULVA: Normal female genitalia. No lesions.   URETHRAL MEATUS: No masses, no significant prolapse.  BLADDER/ URETHRA: No masses or suprapubic tenderness   VAGINA/ CUFF: No lesions. +atrophic changes. No discharge   PELVIS: No masses, tenderness, or fullness on bimanual exam   ANUS/ PERINEUM: Normal tone.  No lesions.          *patient verbally consented for exam and female chaperone present for entire exam     ASSESSMENT and PLAN:  Encounter for well woman exam with routine gynecological exam  -     Liquid-based pap smear, screening    Breast cancer screening by mammogram  -     Mammo Digital Screening Bilat w/ Ruddy; Future; Expected date: 05/31/2024    At high risk for breast cancer  -     MRI Breast w/wo Contrast, w/CAD, Bilateral; Future; Expected date: 11/20/2024    Hormone replacement therapy (HRT)  -     estradioL (ESTRACE) 1 MG tablet; Take 1 tablet (1 mg total) by mouth once daily.  Dispense: 90 tablet; Refill: 4  -     progesterone (PROMETRIUM) 100 MG capsule; Take 1 capsule (100 mg total) by mouth nightly.  Dispense: 30 capsule; Refill: 11    Menopausal state       FOLLOWUP:  1 year for wwe or sooner prn    COUNSELING:  Patient was counseled today on recommendations for yearly pelvic exam, current Pap guidelines, self breast exams, annual screening mammograms, routine screening colonoscopy, and screening " bone density. Reviewed calcium and vitamin D supplements and weight bearing exercise to minimize risks.  Encouraged patient to see her PCP for other health maintenance.

## 2024-05-20 ENCOUNTER — OFFICE VISIT (OUTPATIENT)
Dept: OBSTETRICS AND GYNECOLOGY | Facility: CLINIC | Age: 54
End: 2024-05-20
Payer: COMMERCIAL

## 2024-05-20 VITALS
HEIGHT: 70 IN | SYSTOLIC BLOOD PRESSURE: 147 MMHG | WEIGHT: 156 LBS | BODY MASS INDEX: 22.33 KG/M2 | DIASTOLIC BLOOD PRESSURE: 83 MMHG

## 2024-05-20 DIAGNOSIS — Z79.890 HORMONE REPLACEMENT THERAPY (HRT): ICD-10-CM

## 2024-05-20 DIAGNOSIS — Z12.31 BREAST CANCER SCREENING BY MAMMOGRAM: ICD-10-CM

## 2024-05-20 DIAGNOSIS — Z01.419 ENCOUNTER FOR WELL WOMAN EXAM WITH ROUTINE GYNECOLOGICAL EXAM: Primary | ICD-10-CM

## 2024-05-20 DIAGNOSIS — N95.1 MENOPAUSAL STATE: ICD-10-CM

## 2024-05-20 DIAGNOSIS — Z91.89 AT HIGH RISK FOR BREAST CANCER: ICD-10-CM

## 2024-05-20 PROCEDURE — 3079F DIAST BP 80-89 MM HG: CPT | Mod: CPTII,S$GLB,, | Performed by: OBSTETRICS & GYNECOLOGY

## 2024-05-20 PROCEDURE — 99396 PREV VISIT EST AGE 40-64: CPT | Mod: S$GLB,,, | Performed by: OBSTETRICS & GYNECOLOGY

## 2024-05-20 PROCEDURE — 3077F SYST BP >= 140 MM HG: CPT | Mod: CPTII,S$GLB,, | Performed by: OBSTETRICS & GYNECOLOGY

## 2024-05-20 PROCEDURE — 1159F MED LIST DOCD IN RCRD: CPT | Mod: CPTII,S$GLB,, | Performed by: OBSTETRICS & GYNECOLOGY

## 2024-05-20 PROCEDURE — 3008F BODY MASS INDEX DOCD: CPT | Mod: CPTII,S$GLB,, | Performed by: OBSTETRICS & GYNECOLOGY

## 2024-05-20 PROCEDURE — 99459 PELVIC EXAMINATION: CPT | Mod: S$GLB,,, | Performed by: OBSTETRICS & GYNECOLOGY

## 2024-05-20 RX ORDER — ESTRADIOL 1 MG/1
1 TABLET ORAL DAILY
Qty: 90 TABLET | Refills: 4 | Status: SHIPPED | OUTPATIENT
Start: 2024-05-20 | End: 2025-05-20

## 2024-05-20 RX ORDER — PROGESTERONE 100 MG/1
100 CAPSULE ORAL NIGHTLY
Qty: 30 CAPSULE | Refills: 11 | Status: SHIPPED | OUTPATIENT
Start: 2024-05-20 | End: 2025-05-20

## 2024-05-20 RX ORDER — ESTRADIOL 1 MG/1
1 TABLET ORAL DAILY
Qty: 90 TABLET | Refills: 4 | Status: SHIPPED | OUTPATIENT
Start: 2024-05-20 | End: 2024-05-20 | Stop reason: SDUPTHER

## 2024-05-23 LAB — Lab: NORMAL

## 2024-06-17 DIAGNOSIS — Z91.89 AT HIGH RISK FOR BREAST CANCER: Primary | ICD-10-CM

## 2024-07-24 ENCOUNTER — DOCUMENTATION ONLY (OUTPATIENT)
Dept: OBSTETRICS AND GYNECOLOGY | Facility: CLINIC | Age: 54
End: 2024-07-24
Payer: COMMERCIAL

## 2024-10-07 ENCOUNTER — TELEPHONE (OUTPATIENT)
Dept: OBSTETRICS AND GYNECOLOGY | Facility: CLINIC | Age: 54
End: 2024-10-07
Payer: COMMERCIAL

## 2024-10-07 NOTE — TELEPHONE ENCOUNTER
----- Message from Leighann sent at 10/7/2024  2:14 PM CDT -----  Type:  Needs Medical Advice    Who Called: Shadia Washington    Symptoms (please be specific): -   How long has patient had these symptoms:  -  Pharmacy name and phone #:  -  Would the patient rather a call back or a response via [facility]?    Best Call Back Number: [phone]    Additional Information: pt [facility] so she can schedule appt

## 2024-11-26 ENCOUNTER — TELEPHONE (OUTPATIENT)
Dept: OBSTETRICS AND GYNECOLOGY | Facility: CLINIC | Age: 54
End: 2024-11-26
Payer: COMMERCIAL

## 2024-11-26 NOTE — TELEPHONE ENCOUNTER
Spoke with patient. Two pt identifiers confirmed. Notified patient of normal screening breast MRI results. Patient verbalized understanding.

## 2024-11-26 NOTE — TELEPHONE ENCOUNTER
----- Message from Carolyn Jordan MD sent at 11/26/2024  9:55 AM CST -----  Please call pt and let her know that her screening breast MRI looks all normal    If she is not on there portal, please encourage her to do so

## 2025-05-27 RX ORDER — FLUTICASONE PROPIONATE 50 MCG
2 SPRAY, SUSPENSION (ML) NASAL
COMMUNITY
Start: 2025-04-07

## 2025-05-27 RX ORDER — MONTELUKAST SODIUM 10 MG/1
TABLET ORAL
COMMUNITY
End: 2025-06-02

## 2025-05-27 RX ORDER — PROGESTERONE 100 MG/1
100 CAPSULE ORAL NIGHTLY
COMMUNITY
End: 2025-06-02 | Stop reason: SDUPTHER

## 2025-05-27 RX ORDER — LEVOTHYROXINE SODIUM 150 UG/1
150 TABLET ORAL
COMMUNITY
Start: 2025-01-27

## 2025-05-27 RX ORDER — ACYCLOVIR 800 MG/1
800 TABLET ORAL
COMMUNITY
Start: 2025-02-05

## 2025-05-27 RX ORDER — ESTRADIOL 1 MG/1
1 TABLET ORAL
COMMUNITY
End: 2025-06-02 | Stop reason: SDUPTHER

## 2025-06-02 ENCOUNTER — OFFICE VISIT (OUTPATIENT)
Dept: OBSTETRICS AND GYNECOLOGY | Facility: CLINIC | Age: 55
End: 2025-06-02
Payer: COMMERCIAL

## 2025-06-02 VITALS
DIASTOLIC BLOOD PRESSURE: 91 MMHG | BODY MASS INDEX: 22.19 KG/M2 | SYSTOLIC BLOOD PRESSURE: 134 MMHG | HEIGHT: 70 IN | WEIGHT: 155 LBS

## 2025-06-02 DIAGNOSIS — N95.1 MENOPAUSAL SYNDROME (HOT FLASHES): ICD-10-CM

## 2025-06-02 DIAGNOSIS — Z91.89 AT HIGH RISK FOR BREAST CANCER: ICD-10-CM

## 2025-06-02 DIAGNOSIS — Z12.31 BREAST CANCER SCREENING BY MAMMOGRAM: ICD-10-CM

## 2025-06-02 DIAGNOSIS — Z01.419 ENCOUNTER FOR WELL WOMAN EXAM WITH ROUTINE GYNECOLOGICAL EXAM: Primary | ICD-10-CM

## 2025-06-02 PROBLEM — J30.9 ALLERGIC RHINITIS: Status: ACTIVE | Noted: 2024-11-18

## 2025-06-02 PROBLEM — B00.1 FEVER BLISTER: Status: ACTIVE | Noted: 2024-11-18

## 2025-06-02 PROBLEM — E03.9 HYPOTHYROIDISM: Status: ACTIVE | Noted: 2025-06-02

## 2025-06-02 PROCEDURE — 1159F MED LIST DOCD IN RCRD: CPT | Mod: CPTII,,, | Performed by: OBSTETRICS & GYNECOLOGY

## 2025-06-02 PROCEDURE — 3075F SYST BP GE 130 - 139MM HG: CPT | Mod: CPTII,,, | Performed by: OBSTETRICS & GYNECOLOGY

## 2025-06-02 PROCEDURE — 3008F BODY MASS INDEX DOCD: CPT | Mod: CPTII,,, | Performed by: OBSTETRICS & GYNECOLOGY

## 2025-06-02 PROCEDURE — 3080F DIAST BP >= 90 MM HG: CPT | Mod: CPTII,,, | Performed by: OBSTETRICS & GYNECOLOGY

## 2025-06-02 PROCEDURE — 99396 PREV VISIT EST AGE 40-64: CPT | Mod: S$PBB,,, | Performed by: OBSTETRICS & GYNECOLOGY

## 2025-06-02 RX ORDER — PROGESTERONE 100 MG/1
100 CAPSULE ORAL NIGHTLY
Qty: 90 CAPSULE | Refills: 4 | Status: SHIPPED | OUTPATIENT
Start: 2025-06-02

## 2025-06-02 RX ORDER — ESTRADIOL 1 MG/1
1 TABLET ORAL DAILY
Qty: 90 TABLET | Refills: 4 | Status: SHIPPED | OUTPATIENT
Start: 2025-06-02

## 2025-06-02 RX ORDER — DEXBROMPHENIRAMINE MALEATE, DEXTROMETHORPHAN HBR, PHENYLEPHRINE HCL 2; 20; 10 G/1; G/1; G/1
TABLET ORAL
COMMUNITY
Start: 2024-12-24

## 2025-06-02 RX ORDER — CHLORHEXIDINE GLUCONATE ORAL RINSE 1.2 MG/ML
SOLUTION DENTAL
COMMUNITY
Start: 2025-05-30

## 2025-06-02 RX ORDER — EPINEPHRINE 0.3 MG/.3ML
INJECTION SUBCUTANEOUS
COMMUNITY
Start: 2024-12-30

## 2025-06-02 RX ORDER — PROMETHAZINE HYDROCHLORIDE AND DEXTROMETHORPHAN HYDROBROMIDE 6.25; 15 MG/5ML; MG/5ML
5 SYRUP ORAL EVERY 6 HOURS PRN
COMMUNITY
Start: 2024-12-24

## 2025-06-13 ENCOUNTER — RESULTS FOLLOW-UP (OUTPATIENT)
Dept: OBSTETRICS AND GYNECOLOGY | Facility: CLINIC | Age: 55
End: 2025-06-13

## 2025-06-13 ENCOUNTER — TELEPHONE (OUTPATIENT)
Dept: OBSTETRICS AND GYNECOLOGY | Facility: CLINIC | Age: 55
End: 2025-06-13
Payer: COMMERCIAL

## 2025-06-13 NOTE — TELEPHONE ENCOUNTER
----- Message from Carolyn Jordan MD sent at 6/13/2025 10:45 AM CDT -----  Please let patient know her pap result is ascus but the hpv is negative so we consider that to be a normal result and we will see her in 1 year for her annual visit.       ----- Message -----  From: [facility]  Sent: 6/13/2025   9:14 AM CDT  To: Carolyn Jordan MD